# Patient Record
Sex: FEMALE | Race: BLACK OR AFRICAN AMERICAN
[De-identification: names, ages, dates, MRNs, and addresses within clinical notes are randomized per-mention and may not be internally consistent; named-entity substitution may affect disease eponyms.]

---

## 2018-10-12 ENCOUNTER — HOSPITAL ENCOUNTER (OUTPATIENT)
Dept: HOSPITAL 63 - SURG | Age: 73
Discharge: HOME | End: 2018-10-12
Attending: INTERNAL MEDICINE
Payer: COMMERCIAL

## 2018-10-12 VITALS — DIASTOLIC BLOOD PRESSURE: 88 MMHG | SYSTOLIC BLOOD PRESSURE: 137 MMHG

## 2018-10-12 DIAGNOSIS — J45.909: ICD-10-CM

## 2018-10-12 DIAGNOSIS — K57.30: ICD-10-CM

## 2018-10-12 DIAGNOSIS — Z12.11: Primary | ICD-10-CM

## 2018-10-12 DIAGNOSIS — F32.9: ICD-10-CM

## 2018-10-12 DIAGNOSIS — Z86.010: ICD-10-CM

## 2018-10-12 DIAGNOSIS — Z98.0: ICD-10-CM

## 2018-10-12 PROCEDURE — 45378 DIAGNOSTIC COLONOSCOPY: CPT

## 2020-02-22 ENCOUNTER — HOSPITAL ENCOUNTER (EMERGENCY)
Dept: HOSPITAL 61 - ER | Age: 75
Discharge: HOME | End: 2020-02-22
Payer: MEDICARE

## 2020-02-22 VITALS — BODY MASS INDEX: 28.72 KG/M2 | WEIGHT: 152.12 LBS | HEIGHT: 61 IN

## 2020-02-22 VITALS — SYSTOLIC BLOOD PRESSURE: 149 MMHG | DIASTOLIC BLOOD PRESSURE: 88 MMHG

## 2020-02-22 DIAGNOSIS — Z87.891: ICD-10-CM

## 2020-02-22 DIAGNOSIS — Z85.038: ICD-10-CM

## 2020-02-22 DIAGNOSIS — Z90.710: ICD-10-CM

## 2020-02-22 DIAGNOSIS — N30.00: Primary | ICD-10-CM

## 2020-02-22 LAB
ALBUMIN SERPL-MCNC: 4.2 G/DL (ref 3.4–5)
ALBUMIN/GLOB SERPL: 1.2 {RATIO} (ref 1–1.7)
ALP SERPL-CCNC: 85 U/L (ref 46–116)
ALT SERPL-CCNC: 50 U/L (ref 14–59)
ANION GAP SERPL CALC-SCNC: 9 MMOL/L (ref 6–14)
APTT PPP: YELLOW S
AST SERPL-CCNC: 46 U/L (ref 15–37)
BACTERIA #/AREA URNS HPF: (no result) /HPF
BASOPHILS # BLD AUTO: 0 X10^3/UL (ref 0–0.2)
BASOPHILS NFR BLD: 1 % (ref 0–3)
BILIRUB SERPL-MCNC: 0.4 MG/DL (ref 0.2–1)
BILIRUB UR QL STRIP: NEGATIVE
BUN SERPL-MCNC: 17 MG/DL (ref 7–20)
BUN/CREAT SERPL: 17 (ref 6–20)
CALCIUM SERPL-MCNC: 10.2 MG/DL (ref 8.5–10.1)
CHLORIDE SERPL-SCNC: 103 MMOL/L (ref 98–107)
CO2 SERPL-SCNC: 25 MMOL/L (ref 21–32)
CREAT SERPL-MCNC: 1 MG/DL (ref 0.6–1)
EOSINOPHIL NFR BLD: 0.1 X10^3/UL (ref 0–0.7)
EOSINOPHIL NFR BLD: 2 % (ref 0–3)
ERYTHROCYTE [DISTWIDTH] IN BLOOD BY AUTOMATED COUNT: 15.3 % (ref 11.5–14.5)
FIBRINOGEN PPP-MCNC: CLEAR MG/DL
GFR SERPLBLD BASED ON 1.73 SQ M-ARVRAT: 53.9 ML/MIN
GLOBULIN SER-MCNC: 3.6 G/DL (ref 2.2–3.8)
GLUCOSE SERPL-MCNC: 100 MG/DL (ref 70–99)
HCT VFR BLD CALC: 42.6 % (ref 36–47)
HGB BLD-MCNC: 14.5 G/DL (ref 12–15.5)
LYMPHOCYTES # BLD: 1.9 X10^3/UL (ref 1–4.8)
LYMPHOCYTES NFR BLD AUTO: 38 % (ref 24–48)
MCH RBC QN AUTO: 29 PG (ref 25–35)
MCHC RBC AUTO-ENTMCNC: 34 G/DL (ref 31–37)
MCV RBC AUTO: 86 FL (ref 79–100)
MONO #: 0.6 X10^3/UL (ref 0–1.1)
MONOCYTES NFR BLD: 13 % (ref 0–9)
NEUT #: 2.3 X10^3/UL (ref 1.8–7.7)
NEUTROPHILS NFR BLD AUTO: 47 % (ref 31–73)
NITRITE UR QL STRIP: NEGATIVE
PH UR STRIP: 6.5 [PH]
PLATELET # BLD AUTO: 233 X10^3/UL (ref 140–400)
POTASSIUM SERPL-SCNC: 4.7 MMOL/L (ref 3.5–5.1)
PROT SERPL-MCNC: 7.8 G/DL (ref 6.4–8.2)
PROT UR STRIP-MCNC: NEGATIVE MG/DL
RBC # BLD AUTO: 4.93 X10^6/UL (ref 3.5–5.4)
RBC #/AREA URNS HPF: 0 /HPF (ref 0–2)
SODIUM SERPL-SCNC: 137 MMOL/L (ref 136–145)
SQUAMOUS #/AREA URNS LPF: (no result) /LPF
UROBILINOGEN UR-MCNC: 0.2 MG/DL
WBC # BLD AUTO: 5 X10^3/UL (ref 4–11)
WBC #/AREA URNS HPF: (no result) /HPF (ref 0–4)

## 2020-02-22 PROCEDURE — 80053 COMPREHEN METABOLIC PANEL: CPT

## 2020-02-22 PROCEDURE — 87086 URINE CULTURE/COLONY COUNT: CPT

## 2020-02-22 PROCEDURE — 36415 COLL VENOUS BLD VENIPUNCTURE: CPT

## 2020-02-22 PROCEDURE — 81001 URINALYSIS AUTO W/SCOPE: CPT

## 2020-02-22 PROCEDURE — 85025 COMPLETE CBC W/AUTO DIFF WBC: CPT

## 2020-02-22 PROCEDURE — 76770 US EXAM ABDO BACK WALL COMP: CPT

## 2020-02-22 NOTE — RAD
EXAM: RENAL/RETROPERITONAL ULTRASOUND.

 

HISTORY: Hematuria, urinary tract infection.

 

COMPARISON: None.

 

FINDINGS: Ultrasound of the kidneys, bladder and retroperitoneum was 

performed.

 

The right kidney measures 9.2 cm. Cortical thickness and echogenicity are 

preserved. There is no hydronephrosis.

 

The left kidney measures 10.2 cm. Cortical thickness and echogenicity are 

preserved. There is no hydronephrosis.

 

Images of the bladder reveal no gross abnormality. Both ureteral jets are 

visualized. The abdominal aorta and inferior vena cava are grossly patent 

and normal in caliber.

 

IMPRESSION:

1. Unremarkable examination of the kidneys. No hydronephrosis.

 

Electronically signed by: RADAMES Alexandra MD (2/22/2020 7:55 PM) 

[id number]

## 2020-02-22 NOTE — PHYS DOC
Past Medical History


Past Medical History:  Cancer


Additional Past Medical Histor:  hx colon cancer


Past Surgical History:  Hysterectomy


Smoking Status:  Former Smoker


Alcohol Use:  Occasionally





Adult General


Chief Complaint


Chief Complaint:  PAIN ON URINATION





HPI


HPI





Patient is a 75  year old female who presents with dysuria and urinary frequency

this been ongoing for a month. The patient is felt outpatient treatment and so 

far been on Cipro that was started on  that is not improved symptoms 

and has also used Bactrim was started  did not improve symptoms. 

Denies fevers or any other symptoms. Patient states that she has had blood in 

her urine where urines have been getting checked.





Review of Systems


Review of Systems





Constitutional: Denies fever or chills []


Eyes: Denies change in visual acuity, redness, or eye pain []


HENT: Denies nasal congestion or sore throat []


Respiratory: Denies cough or shortness of breath []


Cardiovascular: No additional information not addressed in HPI []


GI: Denies abdominal pain, nausea, vomiting, bloody stools or diarrhea []


: Denies dysuria or hematuria []


Musculoskeletal: Denies back pain or joint pain []


Integument: Denies rash or skin lesions []


Neurologic: Denies headache, focal weakness or sensory changes []


Endocrine: Denies polyuria or polydipsia []





Complete systems were reviewed and found to be within normal limits, except as 

documented in this note.





Physical Exam


Physical Exam





Constitutional: Well developed, well nourished, no acute distress, non-toxic 

appearance. []


HENT: Normocephalic, atraumatic, bilateral external ears normal, oropharynx 

moist, no oral exudates, nose normal. []


Eyes: PERRLA, EOMI, conjunctiva normal, no discharge. [] 


Neck: Normal range of motion, no tenderness, supple, no stridor. [] 


Cardiovascular:Heart rate regular rhythm, no murmur []


Lungs & Thorax:  Bilateral breath sounds clear to auscultation []


Abdomen: Bowel sounds normal, soft, no tenderness, no masses, no pulsatile 

masses. [] 


Skin: Warm, dry, no erythema, no rash. [] 


Back: No tenderness, no CVA tenderness. [] 


Extremities: No tenderness, no cyanosis, no clubbing, ROM intact, no edema. [] 


Neurologic: Alert and oriented X 3, normal motor function, normal sensory 

function, no focal deficits noted. []


Psychologic: Affect normal, judgement normal, mood normal. []





Current Patient Data


Vital Signs





                                   Vital Signs








  Date Time  Temp Pulse Resp B/P (MAP) Pulse Ox O2 Delivery O2 Flow Rate FiO2


 


20 18:15 98.1 87 16 149/88 (108) 96 Room Air  





 98.1       








Lab Values





                                Laboratory Tests








Test


 20


18:20 20


18:30


 


Urine Collection Type Unknown   


 


Urine Color Yellow   


 


Urine Clarity Clear   


 


Urine pH 6.5   


 


Urine Specific Gravity 1.015   


 


Urine Protein


 Negative mg/dL


(NEG-TRACE) 





 


Urine Glucose (UA)


 Negative mg/dL


(NEG) 





 


Urine Ketones (Stick)


 Negative mg/dL


(NEG) 





 


Urine Blood


 Negative (NEG)


 





 


Urine Nitrite


 Negative (NEG)


 





 


Urine Bilirubin


 Negative (NEG)


 





 


Urine Urobilinogen Dipstick


 0.2 mg/dL (0.2


mg/dL) 





 


Urine Leukocyte Esterase


 Moderate (NEG)


 





 


Urine RBC 0 /HPF (0-2)   


 


Urine WBC


 1-4 /HPF (0-4)


 





 


Urine Squamous Epithelial


Cells Few /LPF  


 





 


Urine Bacteria


 Few /HPF


(0-FEW) 





 


White Blood Count


 


 5.0 x10^3/uL


(4.0-11.0)


 


Red Blood Count


 


 4.93 x10^6/uL


(3.50-5.40)


 


Hemoglobin


 


 14.5 g/dL


(12.0-15.5)


 


Hematocrit


 


 42.6 %


(36.0-47.0)


 


Mean Corpuscular Volume


 


 86 fL ()





 


Mean Corpuscular Hemoglobin  29 pg (25-35)  


 


Mean Corpuscular Hemoglobin


Concent 


 34 g/dL


(31-37)


 


Red Cell Distribution Width


 


 15.3 %


(11.5-14.5)  H


 


Platelet Count


 


 233 x10^3/uL


(140-400)


 


Neutrophils (%) (Auto)  47 % (31-73)  


 


Lymphocytes (%) (Auto)  38 % (24-48)  


 


Monocytes (%) (Auto)  13 % (0-9)  H


 


Eosinophils (%) (Auto)  2 % (0-3)  


 


Basophils (%) (Auto)  1 % (0-3)  


 


Neutrophils # (Auto)


 


 2.3 x10^3/uL


(1.8-7.7)


 


Lymphocytes # (Auto)


 


 1.9 x10^3/uL


(1.0-4.8)


 


Monocytes # (Auto)


 


 0.6 x10^3/uL


(0.0-1.1)


 


Eosinophils # (Auto)


 


 0.1 x10^3/uL


(0.0-0.7)


 


Basophils # (Auto)


 


 0.0 x10^3/uL


(0.0-0.2)


 


Sodium Level


 


 137 mmol/L


(136-145)


 


Potassium Level


 


 4.7 mmol/L


(3.5-5.1)


 


Chloride Level


 


 103 mmol/L


()


 


Carbon Dioxide Level


 


 25 mmol/L


(21-32)


 


Anion Gap  9 (6-14)  


 


Blood Urea Nitrogen


 


 17 mg/dL


(7-20)


 


Creatinine


 


 1.0 mg/dL


(0.6-1.0)


 


Estimated GFR


(Cockcroft-Gault) 


 53.9  





 


BUN/Creatinine Ratio  17 (6-20)  


 


Glucose Level


 


 100 mg/dL


(70-99)  H


 


Calcium Level


 


 10.2 mg/dL


(8.5-10.1)  H


 


Total Bilirubin


 


 0.4 mg/dL


(0.2-1.0)


 


Aspartate Amino Transferase


(AST) 


 46 U/L (15-37)


H


 


Alanine Aminotransferase (ALT)


 


 50 U/L (14-59)





 


Alkaline Phosphatase


 


 85 U/L


()


 


Total Protein


 


 7.8 g/dL


(6.4-8.2)


 


Albumin


 


 4.2 g/dL


(3.4-5.0)


 


Albumin/Globulin Ratio  1.2 (1.0-1.7)  





                                Laboratory Tests


20 18:30








                                Laboratory Tests


20 18:30











EKG


EKG


[]





Radiology/Procedures


Radiology/Procedures


[][facility]


                    [address]  [place], [place] [id number] [phone]


                                        


                                 IMAGING REPORT





                                     Signed





PATIENT: CHARLETTE BRAVO   ACCOUNT: [id number]     MRN#: [id number]


: 1945           LOCATION: ER              AGE: 75


SEX: F                    EXAM DT: 20         ACCESSION#: [id number]


STATUS: PRE ER            ORD. PHYSICIAN: ABDULKADIR MARION


REASON: hematuria, urinary symptoms not improving after multiple rounds of 

antibiot


PROCEDURE: RENAL COMPLETE BILATERAL





EXAM: RENAL/RETROPERITONAL ULTRASOUND.


 


HISTORY: Hematuria, urinary tract infection.


 


COMPARISON: None.


 


FINDINGS: Ultrasound of the kidneys, bladder and retroperitoneum was 


performed.


 


The right kidney measures 9.2 cm. Cortical thickness and echogenicity are 


preserved. There is no hydronephrosis.


 


The left kidney measures 10.2 cm. Cortical thickness and echogenicity are 


preserved. There is no hydronephrosis.


 


Images of the bladder reveal no gross abnormality. Both ureteral jets are 


visualized. The abdominal aorta and inferior vena cava are grossly patent 


and normal in caliber.


 


IMPRESSION:


1. Unremarkable examination of the kidneys. No hydronephrosis.


 


Electronically signed by: RADAMES Alexandra MD (2020 7:55 PM) 


[id number]














DICTATED and SIGNED BY:     SUSANNA ALEXANDRA MD


DATE:     20





Course & Med Decision Making


Course & Med Decision Making


Pertinent Labs and Imaging studies reviewed. (See chart for details)





Will get labs, UA, and will get ultrasound due to blood in urine and the ongoing

 longevity of symptoms to make sure that she is not having bladder cancer. 

Ultrasound is unremarkable. I am concerned that patient has Intersitial 

Cystitis. Will place the patient on Keflex and will also prescribe Methenamine.





UA shows moderate leukocytes.





Dragon Disclaimer


Dragon Disclaimer


This electronic medical record was generated, in whole or in part, using a voice

 recognition dictation system.





Departure


Departure


Impression:  


   Primary Impression:  


   Urinary tract infection


Disposition:  01 HOME, SELF-CARE


Condition:  STABLE


Referrals:  


HERNAN BRYSON MD (PCP)


Patient Instructions:  Interstitial Cystitis





Additional Instructions:  


Thank you for visiting [facility]. We appreciate you trusting us 

with your care. If any additional problems come up don't hesitate to return to 

visit us. Please follow up with your primary care provider so they can plan 

additional care if needed and know about the problem that you had. If symptoms 

worsen come back to the Emergency Department. Any concerning symptoms that start

 such as chest pain, shortness of air, weakness or numbness on one side of the 

body, running high fevers or any other concerning symptoms return to the ER.





Please follow up with urology.





You have been prescribed an antibiotic today to help fight your infection. 

Please take all of the antibiotic as directed. If after 48 hours the infection 

is not improving, please return for more care. If the infection worsens, return 

to ER for additional care.


Scripts


Methen/Sod Phos/Meth Blue/Hyos (Me-Naphos-Mb-Hyo 1 Tablet) 1 Each Tablet


1 TAB PO QID for 10 Days, #40 TAB 0 Refills


   Please drink plenty of fluids with this medication. Please


   be aware that this medication can turn your urine blue to


   blue green.


   Prov: ABDULKADIR MARION         20 


Cephalexin (KEFLEX) 500 Mg Capsule


1 CAP PO BID for 7 Days, #14 CAP 0 Refills


   Prov: ABDULKADIR MARION         20





Problem Qualifiers








   Primary Impression:  


   Urinary tract infection


   Urinary tract infection type:  acute cystitis  Hematuria presence:  without 

   hematuria  Qualified Codes:  N30.00 - Acute cystitis without hematuria








ABDULKADIR MARION          2020 18:43